# Patient Record
Sex: MALE | Race: WHITE | NOT HISPANIC OR LATINO | Employment: STUDENT | URBAN - METROPOLITAN AREA
[De-identification: names, ages, dates, MRNs, and addresses within clinical notes are randomized per-mention and may not be internally consistent; named-entity substitution may affect disease eponyms.]

---

## 2024-01-10 ENCOUNTER — OFFICE VISIT (OUTPATIENT)
Dept: PLASTIC SURGERY | Facility: HOSPITAL | Age: 19
End: 2024-01-10
Payer: COMMERCIAL

## 2024-01-10 ENCOUNTER — MULTIDISCIPLINARY VISIT (OUTPATIENT)
Dept: SPEECH THERAPY | Facility: HOSPITAL | Age: 19
End: 2024-01-10
Payer: COMMERCIAL

## 2024-01-10 VITALS
BODY MASS INDEX: 20.71 KG/M2 | DIASTOLIC BLOOD PRESSURE: 93 MMHG | TEMPERATURE: 98.4 F | WEIGHT: 147.93 LBS | SYSTOLIC BLOOD PRESSURE: 150 MMHG | HEIGHT: 71 IN | HEART RATE: 122 BPM

## 2024-01-10 DIAGNOSIS — Q35.9 CLEFT PALATE (HHS-HCC): Primary | ICD-10-CM

## 2024-01-10 DIAGNOSIS — M26.02 MAXILLARY HYPOPLASIA: ICD-10-CM

## 2024-01-10 DIAGNOSIS — M27.8 PALATAL FISTULA: ICD-10-CM

## 2024-01-10 PROCEDURE — 99213 OFFICE O/P EST LOW 20 MIN: CPT | Performed by: SURGERY

## 2024-01-10 PROCEDURE — 1036F TOBACCO NON-USER: CPT | Performed by: SURGERY

## 2024-01-10 ASSESSMENT — ENCOUNTER SYMPTOMS
DEPRESSION: 0
OCCASIONAL FEELINGS OF UNSTEADINESS: 0
LOSS OF SENSATION IN FEET: 0

## 2024-01-10 NOTE — PROGRESS NOTES
Speech-Language Pathology    [facility]                  Therapy Communication Note    Patient Name: Daniele Medina  MRN: [id number]  Today's Date: 1/10/2024     Discipline: Speech Language Pathology    Comment:   Daniele attended [facility] with Plastic surgery and Speech Pathology. He is s/p jaw surgery. Following jaw surgery he had a fistula and some concern from plastic surgery in regards to possible VPI.   During this informal assessment he did not demonstrate any concern for VPI. Daniele and his parents denied any changes in speech or concerns. At this time there is no further assessment needed from a speech standpoint. Follow up with plastic surgery in regards to fistula repair.

## 2024-01-10 NOTE — PROGRESS NOTES
Clinic Note    Reason For Visit  Palatal fistula    History Of Present Illness  Daniele Medina is a 18 y.o. male with a history of syndrome and cleft palate who most recently underwent orthognathic surgery including LeFort I, BSS oh and genioplasty with my partner Dr. Marte.  His other surgical history includes history of cleft palate repair, multiple eye surgeries and a surgically assisted assisted rapid palatal expansion performed in June 2020 by Dr. Cheng.      The family reports that overall he has been doing well since the surgery has been undergoing finishing orthodontic treatment which is almost completed.  He reports improvement in ability to chew and pain in the longer an issue.  The numbness in his upper and lower lip are also improving as well.  They have not noticed significant change in his speech.      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  He also noticed frequently food getting stuck in that area.  This fistula is very symptomatic and they are interested in discussing surgical options to address this.      Of note, Daniele is currently attending college via online format and is doing well and does not have any issues with communication.     Past Medical History  He has no past medical history on file.    Surgical History  He has a past surgical history that includes Myringotomy w/ tubes (10/13/2014); Other surgical history (10/13/2014); Other surgical history (10/13/2014); and Gastrostomy tube placement (10/13/2014).     Social History  He reports that he has never smoked. He has never been exposed to tobacco smoke. He has never used smokeless tobacco. He reports that he does not drink alcohol. No history on file for drug use.    Allergies  Patient has no known allergies.    Review of Systems  Negative other than what is included in the HPI.      Physical Exam  On exam, Daniele Medina is  well-appearing and well-developed.  he is breathing comfortably on room air and is in no distress.  Focused examination of His affected region reveals:     Lip: no cleft lip  Palate: Repaired cleft palate with a noticeable fistula present in the hard soft junction  Residual concave facial profile improved compared to before  Intraoral exam reveals slight positive overjet and overbite  Fixed orthodontic appliances are still in place    ISR: none  ISS: 3/4 crown  Overjet: 2mm  Overbite: 1mm  MxMid: conicident with facial midline  Mandmid: missing 1 incisor      Nasal exam reveals excess flaring of both ala, more on the left compared to the right  Poor nasal tip support  Mild septal deviation       No hypernasality    Relevant Results      Assessment/Plan     Daniele Medina is a 18 y.o. male who is now 7-month status post orthognathic surgery with asymptomatic palatal fistula.  Today we discussed treatment options for palatal fistula repair.  I recommend using both local flaps and buccal flap.  We did discuss the potential risk for fistula recurrence.  We also discussed the need for second surgery for buccal flap pedicle division.  We went over at length the indication, alternative and risk of this treatment option.  Given that he is very symptomatic I think he is a good candidate for the surgery.     In terms of his nose, we did discuss that he will ultimately benefit from a completion rhinoplasty to address the septal deviation, bilateral flaring and poor nasal tip support as a result of his dental facial deformity.  I would recommend waiting until we complete the palatal fistula repair.    The family is interested in scheduling the palatal fistula repair surgery this summer when he is on break from his studies.  Will plan to meet approximate 2 weeks before for preoperative appointment.  Of note, he does have a history of congenital heart disease and well likely require cardiac anesthesia to be available.    I spent  20 minutes in the professional and overall care of this patient.      Obed Su MD

## 2024-01-23 PROBLEM — M27.8: Status: ACTIVE | Noted: 2024-01-10

## 2024-01-23 PROBLEM — Q35.9 CLEFT PALATE (HHS-HCC): Status: ACTIVE | Noted: 2024-01-10

## 2024-05-06 ENCOUNTER — TELEMEDICINE (OUTPATIENT)
Dept: PLASTIC SURGERY | Facility: CLINIC | Age: 19
End: 2024-05-06
Payer: COMMERCIAL

## 2024-05-06 DIAGNOSIS — M27.8 PALATAL FISTULA: Primary | ICD-10-CM

## 2024-05-06 DIAGNOSIS — Q35.9 CLEFT PALATE (HHS-HCC): ICD-10-CM

## 2024-05-06 PROCEDURE — 99213 OFFICE O/P EST LOW 20 MIN: CPT | Performed by: SURGERY

## 2024-05-06 PROCEDURE — 1036F TOBACCO NON-USER: CPT | Performed by: SURGERY

## 2024-05-06 NOTE — H&P (VIEW-ONLY)
Clinic Note    Reason For Visit  Palatal fistula    History Of Present Illness  Daniele Medina is a 18 y.o. male with a history of syndrome and cleft palate who most recently underwent orthognathic surgery including LeFort I, BSS oh and genioplasty with my partner Dr. Marte in 2023.  His other surgical history includes history of cleft palate repair, multiple eye surgeries and a surgically assisted assisted rapid palatal expansion performed in June 2020 by Dr. Cheng. He was recently seen for a symptomatic palatal fistula and now presents for a preoperative visit for planned fistula repair.      Past Medical History  He has no past medical history on file.    Surgical History  He has a past surgical history that includes Myringotomy w/ tubes (10/13/2014); Other surgical history (10/13/2014); Other surgical history (10/13/2014); and Gastrostomy tube placement (10/13/2014).     Social History  He reports that he has never smoked. He has never been exposed to tobacco smoke. He has never used smokeless tobacco. He reports that he does not drink alcohol. No history on file for drug use.    Allergies  Patient has no known allergies.    Review of Systems  Negative other than what is included in the HPI.      Physical Exam  On exam, Daniele Medina is well-appearing and well-developed.  he is breathing comfortably on room air and is in no distress.  Focused examination of His affected region reveals:     Lip: no cleft lip  Palate: Repaired cleft palate with a noticeable fistula present in the hard soft junction  Residual concave facial profile improved compared to before  Intraoral exam reveals slight positive overjet and overbite  Fixed orthodontic appliances are still in place    ISR: none  ISS: 3/4 crown  Overjet: 2mm  Overbite: 1mm  MxMid: conicident with facial midline  Mandmid: missing 1 incisor      Nasal exam reveals excess flaring of both ala, more on the left compared to the right  Poor nasal tip support  Mild  septal deviation       No hypernasality    Relevant Results      Assessment/Plan     Daniele Medina is a 18 y.o. male who is now nearly 12-month status post orthognathic surgery with symptomatic palatal fistula.  Today we discussed details of palatal fistula repair.  I recommend using both local flaps and buccal flap.  We did discuss the potential risk for fistula recurrence.  We also discussed the need for second surgery for buccal flap pedicle division.  We went over at length the indication, alternative and risk of this treatment option.  Given that he is very symptomatic I think he is a good candidate for the surgery.     In terms of his nose, we did discuss that he will ultimately benefit from a completion rhinoplasty to address the septal deviation, bilateral flaring and poor nasal tip support as a result of his dental facial deformity.  I would recommend waiting until we complete the palatal fistula repair. 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     I spent 20 minutes in the professional and overall care of this patient.      Obed Su MD

## 2024-05-13 ENCOUNTER — ANESTHESIA EVENT (OUTPATIENT)
Dept: OPERATING ROOM | Facility: HOSPITAL | Age: 19
End: 2024-05-13
Payer: COMMERCIAL

## 2024-05-14 ENCOUNTER — HOSPITAL ENCOUNTER (OUTPATIENT)
Facility: HOSPITAL | Age: 19
Discharge: HOME | End: 2024-05-15
Attending: SURGERY | Admitting: SURGERY
Payer: COMMERCIAL

## 2024-05-14 ENCOUNTER — ANESTHESIA (OUTPATIENT)
Dept: OPERATING ROOM | Facility: HOSPITAL | Age: 19
End: 2024-05-14
Payer: COMMERCIAL

## 2024-05-14 DIAGNOSIS — M27.8 PALATAL FISTULA: Primary | ICD-10-CM

## 2024-05-14 DIAGNOSIS — Q35.9 CLEFT PALATE (HHS-HCC): ICD-10-CM

## 2024-05-14 PROBLEM — T88.4XXA DIFFICULT INTUBATION: Status: ACTIVE | Noted: 2024-05-14

## 2024-05-14 PROBLEM — I27.20 PULMONARY HYPERTENSION (MULTI): Status: ACTIVE | Noted: 2024-05-14

## 2024-05-14 PROBLEM — Z98.890 PONV (POSTOPERATIVE NAUSEA AND VOMITING): Status: ACTIVE | Noted: 2024-05-14

## 2024-05-14 PROBLEM — R11.2 PONV (POSTOPERATIVE NAUSEA AND VOMITING): Status: ACTIVE | Noted: 2024-05-14

## 2024-05-14 PROBLEM — I35.0 AORTIC STENOSIS: Status: ACTIVE | Noted: 2024-05-14

## 2024-05-14 PROCEDURE — 2500000002 HC RX 250 W HCPCS SELF ADMINISTERED DRUGS (ALT 637 FOR MEDICARE OP, ALT 636 FOR OP/ED): Performed by: STUDENT IN AN ORGANIZED HEALTH CARE EDUCATION/TRAINING PROGRAM

## 2024-05-14 PROCEDURE — 3700000002 HC GENERAL ANESTHESIA TIME - EACH INCREMENTAL 1 MINUTE: Performed by: SURGERY

## 2024-05-14 PROCEDURE — 2500000001 HC RX 250 WO HCPCS SELF ADMINISTERED DRUGS (ALT 637 FOR MEDICARE OP): Performed by: SURGERY

## 2024-05-14 PROCEDURE — 2500000005 HC RX 250 GENERAL PHARMACY W/O HCPCS: Performed by: STUDENT IN AN ORGANIZED HEALTH CARE EDUCATION/TRAINING PROGRAM

## 2024-05-14 PROCEDURE — 3700000001 HC GENERAL ANESTHESIA TIME - INITIAL BASE CHARGE: Performed by: SURGERY

## 2024-05-14 PROCEDURE — 7100000001 HC RECOVERY ROOM TIME - INITIAL BASE CHARGE: Performed by: SURGERY

## 2024-05-14 PROCEDURE — G0378 HOSPITAL OBSERVATION PER HR: HCPCS

## 2024-05-14 PROCEDURE — 2780000003 HC OR 278 NO HCPCS: Performed by: SURGERY

## 2024-05-14 PROCEDURE — 2500000004 HC RX 250 GENERAL PHARMACY W/ HCPCS (ALT 636 FOR OP/ED): Performed by: SURGERY

## 2024-05-14 PROCEDURE — 30600 REPAIR MOUTH/NOSE FISTULA: CPT | Performed by: SURGERY

## 2024-05-14 PROCEDURE — 7100000002 HC RECOVERY ROOM TIME - EACH INCREMENTAL 1 MINUTE: Performed by: SURGERY

## 2024-05-14 PROCEDURE — 2500000005 HC RX 250 GENERAL PHARMACY W/O HCPCS: Performed by: SURGERY

## 2024-05-14 PROCEDURE — 15733 MUSC MYOQ/FSCQ FLP H&N PEDCL: CPT | Performed by: SURGERY

## 2024-05-14 PROCEDURE — 2500000001 HC RX 250 WO HCPCS SELF ADMINISTERED DRUGS (ALT 637 FOR MEDICARE OP): Performed by: STUDENT IN AN ORGANIZED HEALTH CARE EDUCATION/TRAINING PROGRAM

## 2024-05-14 PROCEDURE — 2720000007 HC OR 272 NO HCPCS: Performed by: SURGERY

## 2024-05-14 PROCEDURE — 2500000004 HC RX 250 GENERAL PHARMACY W/ HCPCS (ALT 636 FOR OP/ED): Performed by: STUDENT IN AN ORGANIZED HEALTH CARE EDUCATION/TRAINING PROGRAM

## 2024-05-14 PROCEDURE — 15733 MUSC MYOQ/FSCQ FLP H&N PEDCL: CPT | Performed by: STUDENT IN AN ORGANIZED HEALTH CARE EDUCATION/TRAINING PROGRAM

## 2024-05-14 PROCEDURE — A4217 STERILE WATER/SALINE, 500 ML: HCPCS | Performed by: SURGERY

## 2024-05-14 PROCEDURE — A30600 PR REPAIR ORO-NASAL FISTULA: Performed by: STUDENT IN AN ORGANIZED HEALTH CARE EDUCATION/TRAINING PROGRAM

## 2024-05-14 PROCEDURE — 3600000009 HC OR TIME - EACH INCREMENTAL 1 MINUTE - PROCEDURE LEVEL FOUR: Performed by: SURGERY

## 2024-05-14 PROCEDURE — 3600000004 HC OR TIME - INITIAL BASE CHARGE - PROCEDURE LEVEL FOUR: Performed by: SURGERY

## 2024-05-14 RX ORDER — MIDAZOLAM HYDROCHLORIDE 1 MG/ML
INJECTION INTRAMUSCULAR; INTRAVENOUS AS NEEDED
Status: DISCONTINUED | OUTPATIENT
Start: 2024-05-14 | End: 2024-05-14

## 2024-05-14 RX ORDER — PHENYLEPHRINE HCL IN 0.9% NACL 0.4MG/10ML
SYRINGE (ML) INTRAVENOUS AS NEEDED
Status: DISCONTINUED | OUTPATIENT
Start: 2024-05-14 | End: 2024-05-14

## 2024-05-14 RX ORDER — DEXTROSE MONOHYDRATE AND SODIUM CHLORIDE 5; .45 G/100ML; G/100ML
100 INJECTION, SOLUTION INTRAVENOUS CONTINUOUS
Status: DISCONTINUED | OUTPATIENT
Start: 2024-05-14 | End: 2024-05-15 | Stop reason: HOSPADM

## 2024-05-14 RX ORDER — ONDANSETRON HYDROCHLORIDE 2 MG/ML
4 INJECTION, SOLUTION INTRAVENOUS EVERY 8 HOURS PRN
Status: DISCONTINUED | OUTPATIENT
Start: 2024-05-14 | End: 2024-05-14

## 2024-05-14 RX ORDER — HYDROMORPHONE HYDROCHLORIDE 1 MG/ML
0.2 INJECTION, SOLUTION INTRAMUSCULAR; INTRAVENOUS; SUBCUTANEOUS EVERY 10 MIN PRN
Status: DISCONTINUED | OUTPATIENT
Start: 2024-05-14 | End: 2024-05-14 | Stop reason: HOSPADM

## 2024-05-14 RX ORDER — PROPOFOL 10 MG/ML
INJECTION, EMULSION INTRAVENOUS AS NEEDED
Status: DISCONTINUED | OUTPATIENT
Start: 2024-05-14 | End: 2024-05-14

## 2024-05-14 RX ORDER — SODIUM CHLORIDE, SODIUM LACTATE, POTASSIUM CHLORIDE, CALCIUM CHLORIDE 600; 310; 30; 20 MG/100ML; MG/100ML; MG/100ML; MG/100ML
100 INJECTION, SOLUTION INTRAVENOUS CONTINUOUS
Status: DISCONTINUED | OUTPATIENT
Start: 2024-05-14 | End: 2024-05-14 | Stop reason: HOSPADM

## 2024-05-14 RX ORDER — ONDANSETRON HYDROCHLORIDE 2 MG/ML
4 INJECTION, SOLUTION INTRAVENOUS ONCE AS NEEDED
Status: DISCONTINUED | OUTPATIENT
Start: 2024-05-14 | End: 2024-05-14 | Stop reason: HOSPADM

## 2024-05-14 RX ORDER — SODIUM CHLORIDE, SODIUM LACTATE, POTASSIUM CHLORIDE, CALCIUM CHLORIDE 600; 310; 30; 20 MG/100ML; MG/100ML; MG/100ML; MG/100ML
INJECTION, SOLUTION INTRAVENOUS CONTINUOUS PRN
Status: DISCONTINUED | OUTPATIENT
Start: 2024-05-14 | End: 2024-05-14

## 2024-05-14 RX ORDER — ACETAMINOPHEN 160 MG/5ML
650 SUSPENSION ORAL EVERY 6 HOURS
Status: DISCONTINUED | OUTPATIENT
Start: 2024-05-14 | End: 2024-05-15 | Stop reason: HOSPADM

## 2024-05-14 RX ORDER — APREPITANT 40 MG/1
40 CAPSULE ORAL ONCE
Status: COMPLETED | OUTPATIENT
Start: 2024-05-14 | End: 2024-05-14

## 2024-05-14 RX ORDER — DORZOLAMIDE HYDROCHLORIDE AND TIMOLOL MALEATE 20; 5 MG/ML; MG/ML
1 SOLUTION/ DROPS OPHTHALMIC 2 TIMES DAILY
COMMUNITY

## 2024-05-14 RX ORDER — ACETAMINOPHEN 10 MG/ML
INJECTION, SOLUTION INTRAVENOUS AS NEEDED
Status: DISCONTINUED | OUTPATIENT
Start: 2024-05-14 | End: 2024-05-14

## 2024-05-14 RX ORDER — BRIMONIDINE TARTRATE 2 MG/ML
1 SOLUTION/ DROPS OPHTHALMIC 2 TIMES DAILY
COMMUNITY

## 2024-05-14 RX ORDER — CHLORHEXIDINE GLUCONATE ORAL RINSE 1.2 MG/ML
15 SOLUTION DENTAL 3 TIMES DAILY
Status: DISCONTINUED | OUTPATIENT
Start: 2024-05-14 | End: 2024-05-15 | Stop reason: HOSPADM

## 2024-05-14 RX ORDER — BUSPIRONE HYDROCHLORIDE 5 MG/1
5 TABLET ORAL 2 TIMES DAILY
Status: DISCONTINUED | OUTPATIENT
Start: 2024-05-14 | End: 2024-05-15 | Stop reason: HOSPADM

## 2024-05-14 RX ORDER — ROCURONIUM BROMIDE 10 MG/ML
INJECTION, SOLUTION INTRAVENOUS AS NEEDED
Status: DISCONTINUED | OUTPATIENT
Start: 2024-05-14 | End: 2024-05-14

## 2024-05-14 RX ORDER — PROPOFOL 10 MG/ML
INJECTION, EMULSION INTRAVENOUS CONTINUOUS PRN
Status: DISCONTINUED | OUTPATIENT
Start: 2024-05-14 | End: 2024-05-14

## 2024-05-14 RX ORDER — OXYCODONE HCL 5 MG/5 ML
5 SOLUTION, ORAL ORAL EVERY 6 HOURS PRN
Status: DISCONTINUED | OUTPATIENT
Start: 2024-05-14 | End: 2024-05-15 | Stop reason: HOSPADM

## 2024-05-14 RX ORDER — BUSPIRONE HYDROCHLORIDE 5 MG/1
5 TABLET ORAL 2 TIMES DAILY
COMMUNITY

## 2024-05-14 RX ORDER — ONDANSETRON HYDROCHLORIDE 2 MG/ML
INJECTION, SOLUTION INTRAVENOUS AS NEEDED
Status: DISCONTINUED | OUTPATIENT
Start: 2024-05-14 | End: 2024-05-14

## 2024-05-14 RX ORDER — DEXMEDETOMIDINE IN 0.9 % NACL 20 MCG/5ML
SYRINGE (ML) INTRAVENOUS AS NEEDED
Status: DISCONTINUED | OUTPATIENT
Start: 2024-05-14 | End: 2024-05-14

## 2024-05-14 RX ORDER — CHLORHEXIDINE GLUCONATE ORAL RINSE 1.2 MG/ML
SOLUTION DENTAL AS NEEDED
Status: DISCONTINUED | OUTPATIENT
Start: 2024-05-14 | End: 2024-05-14 | Stop reason: HOSPADM

## 2024-05-14 RX ORDER — CEFAZOLIN 1 G/1
INJECTION, POWDER, FOR SOLUTION INTRAVENOUS AS NEEDED
Status: DISCONTINUED | OUTPATIENT
Start: 2024-05-14 | End: 2024-05-14

## 2024-05-14 RX ORDER — LIDOCAINE HYDROCHLORIDE 20 MG/ML
INJECTION, SOLUTION INFILTRATION; PERINEURAL AS NEEDED
Status: DISCONTINUED | OUTPATIENT
Start: 2024-05-14 | End: 2024-05-14

## 2024-05-14 RX ORDER — TRIPROLIDINE/PSEUDOEPHEDRINE 2.5MG-60MG
400 TABLET ORAL EVERY 6 HOURS SCHEDULED
Status: DISCONTINUED | OUTPATIENT
Start: 2024-05-14 | End: 2024-05-15 | Stop reason: HOSPADM

## 2024-05-14 RX ORDER — FENTANYL CITRATE 50 UG/ML
INJECTION, SOLUTION INTRAMUSCULAR; INTRAVENOUS AS NEEDED
Status: DISCONTINUED | OUTPATIENT
Start: 2024-05-14 | End: 2024-05-14

## 2024-05-14 RX ORDER — LATANOPROST 50 UG/ML
1 SOLUTION/ DROPS OPHTHALMIC NIGHTLY
COMMUNITY

## 2024-05-14 RX ORDER — HYDROMORPHONE HYDROCHLORIDE 1 MG/ML
INJECTION, SOLUTION INTRAMUSCULAR; INTRAVENOUS; SUBCUTANEOUS AS NEEDED
Status: DISCONTINUED | OUTPATIENT
Start: 2024-05-14 | End: 2024-05-14

## 2024-05-14 RX ORDER — BUPIVACAINE HYDROCHLORIDE AND EPINEPHRINE 2.5; 5 MG/ML; UG/ML
INJECTION, SOLUTION EPIDURAL; INFILTRATION; INTRACAUDAL; PERINEURAL AS NEEDED
Status: DISCONTINUED | OUTPATIENT
Start: 2024-05-14 | End: 2024-05-14 | Stop reason: HOSPADM

## 2024-05-14 RX ADMIN — PROPOFOL 50 MG: 10 INJECTION, EMULSION INTRAVENOUS at 11:23

## 2024-05-14 RX ADMIN — DEXAMETHASONE SODIUM PHOSPHATE 8 MG: 4 INJECTION INTRA-ARTICULAR; INTRALESIONAL; INTRAMUSCULAR; INTRAVENOUS; SOFT TISSUE at 11:59

## 2024-05-14 RX ADMIN — SODIUM CHLORIDE, SODIUM LACTATE, POTASSIUM CHLORIDE, CALCIUM CHLORIDE: 600; 310; 30; 20 INJECTION, SOLUTION INTRAVENOUS at 12:45

## 2024-05-14 RX ADMIN — Medication 4 MCG: at 14:01

## 2024-05-14 RX ADMIN — IBUPROFEN 400 MG: 100 SUSPENSION ORAL at 19:41

## 2024-05-14 RX ADMIN — ROCURONIUM BROMIDE 5 MG: 10 INJECTION INTRAVENOUS at 14:03

## 2024-05-14 RX ADMIN — CEFAZOLIN 2 G: 330 INJECTION, POWDER, FOR SOLUTION INTRAMUSCULAR; INTRAVENOUS at 12:05

## 2024-05-14 RX ADMIN — HYDROMORPHONE HYDROCHLORIDE 0.2 MG: 1 INJECTION, SOLUTION INTRAMUSCULAR; INTRAVENOUS; SUBCUTANEOUS at 13:03

## 2024-05-14 RX ADMIN — FENTANYL CITRATE 25 MCG: 50 INJECTION, SOLUTION INTRAMUSCULAR; INTRAVENOUS at 11:59

## 2024-05-14 RX ADMIN — DEXAMETHASONE SODIUM PHOSPHATE 8 MG: 4 INJECTION, SOLUTION INTRA-ARTICULAR; INTRALESIONAL; INTRAMUSCULAR; INTRAVENOUS; SOFT TISSUE at 20:32

## 2024-05-14 RX ADMIN — CHLORHEXIDINE GLUCONATE 0.12% ORAL RINSE 15 ML: 1.2 LIQUID ORAL at 20:32

## 2024-05-14 RX ADMIN — SODIUM CHLORIDE, POTASSIUM CHLORIDE, SODIUM LACTATE AND CALCIUM CHLORIDE: 600; 310; 30; 20 INJECTION, SOLUTION INTRAVENOUS at 11:22

## 2024-05-14 RX ADMIN — HYDROMORPHONE HYDROCHLORIDE 0.2 MG: 1 INJECTION, SOLUTION INTRAMUSCULAR; INTRAVENOUS; SUBCUTANEOUS at 14:41

## 2024-05-14 RX ADMIN — FENTANYL CITRATE 75 MCG: 50 INJECTION, SOLUTION INTRAMUSCULAR; INTRAVENOUS at 11:24

## 2024-05-14 RX ADMIN — PROPOFOL 25 MCG/KG/MIN: 10 INJECTION, EMULSION INTRAVENOUS at 12:01

## 2024-05-14 RX ADMIN — PROPOFOL 30 MG: 10 INJECTION, EMULSION INTRAVENOUS at 11:25

## 2024-05-14 RX ADMIN — ACETAMINOPHEN 650 MG: 160 SUSPENSION ORAL at 22:49

## 2024-05-14 RX ADMIN — SUGAMMADEX 200 MG: 100 INJECTION, SOLUTION INTRAVENOUS at 14:55

## 2024-05-14 RX ADMIN — PROPOFOL 20 MG: 10 INJECTION, EMULSION INTRAVENOUS at 11:28

## 2024-05-14 RX ADMIN — MIDAZOLAM HYDROCHLORIDE 2 MG: 1 INJECTION, SOLUTION INTRAMUSCULAR; INTRAVENOUS at 11:08

## 2024-05-14 RX ADMIN — ACETAMINOPHEN 1000 MG: 10 INJECTION, SOLUTION INTRAVENOUS at 12:21

## 2024-05-14 RX ADMIN — PROPOFOL 10 MG: 10 INJECTION, EMULSION INTRAVENOUS at 12:50

## 2024-05-14 RX ADMIN — LIDOCAINE HYDROCHLORIDE 60 MG: 20 INJECTION, SOLUTION INFILTRATION; PERINEURAL at 11:24

## 2024-05-14 RX ADMIN — ACETAMINOPHEN 650 MG: 160 SUSPENSION ORAL at 17:42

## 2024-05-14 RX ADMIN — DEXTROSE AND SODIUM CHLORIDE 100 ML/HR: 5; 450 INJECTION, SOLUTION INTRAVENOUS at 17:42

## 2024-05-14 RX ADMIN — ROCURONIUM BROMIDE 10 MG: 10 INJECTION INTRAVENOUS at 13:00

## 2024-05-14 RX ADMIN — Medication 4 MCG: at 13:37

## 2024-05-14 RX ADMIN — ROCURONIUM BROMIDE 50 MG: 10 INJECTION INTRAVENOUS at 11:25

## 2024-05-14 RX ADMIN — Medication 4 MCG: at 12:47

## 2024-05-14 RX ADMIN — APREPITANT 40 MG: 40 CAPSULE ORAL at 10:45

## 2024-05-14 RX ADMIN — CHLORHEXIDINE GLUCONATE 0.12% ORAL RINSE 15 ML: 1.2 LIQUID ORAL at 18:52

## 2024-05-14 RX ADMIN — Medication 4 MCG: at 12:55

## 2024-05-14 RX ADMIN — PROPOFOL 30 MG: 10 INJECTION, EMULSION INTRAVENOUS at 11:27

## 2024-05-14 RX ADMIN — ROCURONIUM BROMIDE 10 MG: 10 INJECTION INTRAVENOUS at 12:48

## 2024-05-14 RX ADMIN — Medication 40 MCG: at 11:40

## 2024-05-14 RX ADMIN — ONDANSETRON 4 MG: 2 INJECTION INTRAMUSCULAR; INTRAVENOUS at 14:41

## 2024-05-14 SDOH — HEALTH STABILITY: MENTAL HEALTH: CURRENT SMOKER: 0

## 2024-05-14 ASSESSMENT — PAIN - FUNCTIONAL ASSESSMENT
PAIN_FUNCTIONAL_ASSESSMENT: 0-10

## 2024-05-14 ASSESSMENT — PAIN SCALES - GENERAL
PAINLEVEL_OUTOF10: 1
PAINLEVEL_OUTOF10: 0 - NO PAIN
PAINLEVEL_OUTOF10: 1

## 2024-05-14 ASSESSMENT — PAIN INTENSITY VAS
VAS_PAIN_GENERAL: 2
VAS_PAIN_GENERAL: 1

## 2024-05-14 NOTE — PROGRESS NOTES
"  Department of Plastic and Reconstructive Surgery  Daily Progress Note    Patient Name: Daniele Medina  MRN: [id number]  Date:  05/14/24     Subjective  Resting in bed comfortably post operatively. Denies any acute concerns, pain well controlled. Denies any fever, chills, night sweats, CP, SOB, palpitations, nausea, vomiting, or abdominal pain/discomfort.     Overnight Events  POD0 S/P palatal fistula repair, left buccal fat flap    Objective    Vital Signs  /78 (BP Location: Left arm, Patient Position: Lying)   Pulse 96   Temp 36.5 °C (97.7 °F) (Temporal)   Resp 16   Ht 1.78 m (5' 10.08\")   Wt 67.1 kg (148 lb 0.6 oz)   SpO2 96%   BMI 21.19 kg/m²      Physical Exam   Constitutional: Alert, awake, calm and cooperative. NAD. Parents at bedside  Eyes: EOMI, PERRL  Mouth: Intraoral incisions intact with no evidence of drainage, bleeding or dehiscence. No cleft lip.  Head/Neck: Residual concave facial profile  Respiratory: Unlabored respirations on RA  Cardiovascular: RRR on telemetry monitor  Abdomen: Soft, nt/nd  : Voiding independently  MSK: MAEx4, no swelling of joints  Neuro: A&Ox3, no deficits appreciated  Skin: Warm, dry, intact  Psych: Appropriate mood/behavior    Diagnostics   No results found for this or any previous visit (from the past 24 hour(s)).  No results found.    Current Medications  Scheduled medications  acetaminophen, 650 mg, oral, q6h  busPIRone, 5 mg, oral, BID  chlorhexidine, 15 mL, Swish & Spit, TID  [START ON 5/15/2024] dexAMETHasone, 4 mg, intravenous, q8h  dexAMETHasone, 8 mg, intravenous, Once  ibuprofen, 400 mg, oral, q6h KAM      Continuous medications  dextrose 5%-0.45 % sodium chloride, 100 mL/hr      PRN medications  PRN medications: oxyCODONE     Assessment  -5/16/23- Lefort 1, BSSO, genioplasty   -6/29/20- surgically assisted rapid palatal expansion   -CP repair, and eye surgery x 2    Daniele is now s/p palatal fistula repair and left buccal fat flap today with Dr. Su. " Patient was extubated post-operatively without complication, and when stable transferred from PACU to the regular [place] [id number] nursing floor.  Oxygen saturation has remained stable since extubation in OR.     Plan:  - Monitor overnight on regular nursing floor  - Continuous pulse ox overnight. Keep HOB elevated.   - Full liquid diet        ·  No straws. Use care when using utensils to not disrupt intraoral closure.  - IVF saline lock when tolerating liquid diet  - Decadron q8 x 3 doses. 8mg (At 2030) then 4 mg x 2. (0430, 1230)  - No zofran, given cardiac history (aortic stenosis, unicuspid aortic valve)  - Only suction intraorally with soft French catheter suction to help with oral secretions as needed, but avoid the palate area. Do not suction with Yankauer.  - SCDs until ambulating. Early ambulation.   - Post operative pain management:   - Acetaminophen PO Q6 Scheduled   - Ibuprofen PO Q6 Scheduled   - Oxycodone PO Q6 PRN for moderate pain- Use for first line breakthrough pain  - Anxiety- continue home Buspar PO 5mg BID   - Home going medication to be sent to preferred outpatient pharmacy   - Follow up with Dr. Su in 2 weeks (appt time to be given at discharge)    Patient and plan discussed with Dr. Georges Thomas, PIOTR-CNP  Plastic and Reconstructive Surgery   Available via Epic chat, pager: [phone] or team phones: [phone]

## 2024-05-14 NOTE — ANESTHESIA PROCEDURE NOTES
Airway  Date/Time: 5/14/2024 11:29 AM  Urgency: elective    Difficult airway    Staffing  Performed: resident   Authorized by: Dorothy Morrow MD    Performed by: Margaret Cottrell MD  Patient location during procedure: OR    Indications and Patient Condition  Indications for airway management: anesthesia  Spontaneous Ventilation: absent  Sedation level: deep  Preoxygenated: yes  Patient position: sniffing  Mask difficulty assessment: 1 - vent by mask    Final Airway Details  Final airway type: endotracheal airway      Successful airway: ETT and MEAGHAN tube  Cuffed: yes   Successful intubation technique: video laryngoscopy (Glidescope, S3 blade)  Facilitating devices/methods: intubating stylet (light cricoid pressure; glidescope stylet)  Endotracheal tube insertion site: oral  Blade size: #3 (S3)  ETT size (mm): 7.5  Cormack-Lehane Classification: grade I - full view of glottis  Placement verified by: chest auscultation and capnometry   Cuff volume (mL): 2  Number of attempts at approach: 1  Number of other approaches attempted: 0    Additional Comments  Easy BMV. Grade 1 view with Glidescope S3 blade with glidescope stylet and light cricoid pressure; ETT passed through vocal cords easily; atraumatic intubation, teeth and lips in pre-anesthetic condition.

## 2024-05-14 NOTE — PERIOPERATIVE NURSING NOTE
1508- Pt received from OR, resting quietly, connected to monitor with alarms set and on. HOB flat, with SR up and wheels locked. Report obtained from anesthesia. VSS. Will cont to monitor   1520- Pt awake asking appropriate questions.    1524- Parents at BS.  1535- Pt resting quietly, RN unavailable to get report at this time   1548- Dr Su at BS to speak with patient and family  1552- Pt resting quietly, report called to Blanquita LOWE  1559- Pt resting quietly, transported to  with this RN and Herve LOWE transporting pt.

## 2024-05-14 NOTE — ANESTHESIA PROCEDURE NOTES
Peripheral IV  Date/Time: 5/14/2024 11:40 AM  Inserted by: Margaret Cottrell MD    Placement  Needle size: 18 G  Laterality: right  Location: hand  Local anesthetic: none  Site prep: alcohol  Technique: anatomical landmarks  Attempts: 1

## 2024-05-14 NOTE — ANESTHESIA POSTPROCEDURE EVALUATION
Patient: Daniele Medina    Procedure Summary       Date: 05/14/24 Room / Location: [facility] OR 04 / [facility]    Anesthesia Start: 1107 Anesthesia Stop: 1520    Procedure: Repair Fistula Oronasal (Mouth) Diagnosis:       Cleft palate ([facility])      Palatal fistula      (Cleft palate [Q35.9])      (Palatal fistula [M27.8])    Surgeons: Obed Su MD Responsible Provider: Dorothy Morrow MD    Anesthesia Type: general ASA Status: 3            Anesthesia Type: general    Vitals Value Taken Time   /68 05/14/24 1508   Temp 36.8 05/14/24 1520   Pulse 102 05/14/24 1508   Resp 16 05/14/24 1520   SpO2 95 % 05/14/24 1508       Anesthesia Post Evaluation    Patient location during evaluation: PACU  Patient participation: complete - patient participated  Level of consciousness: awake  Pain management: adequate  Airway patency: patent  Cardiovascular status: acceptable  Respiratory status: acceptable  Hydration status: acceptable  Postoperative Nausea and Vomiting: none        No notable events documented.

## 2024-05-14 NOTE — OP NOTE
Repair Fistula Oronasal Operative Note     Date: 2024  OR Location: [facility]    Name: Daniele Medina, : 2005, Age: 18 y.o., MRN: [id number], Sex: male    Diagnosis  Pre-op Diagnosis     * Cleft palate ([facility]) [Q35.9]     * Palatal fistula [M27.8] Post-op Diagnosis     * Cleft palate ([facility]) [Q35.9]     * Palatal fistula [M27.8]     Procedures  Palatal Fistula repair (47069)  Left buccal fat flap (26943)  Surgeons      * Obed Su - Primary    Resident/Fellow/Other Assistant:  Surgeons and Role:     * Darion Ortiz PA-C - YOSELIN First Assist    Darion Ortiz PA-C served as the first surgical assist as there were no qualified residents available.     Procedure Summary  Anesthesia: General  ASA: III  Anesthesia Staff: Anesthesiologist: Dorothy Morrow MD; Everette King MD  Anesthesia Resident: Margaret Cottrell MD  Estimated Blood Loss: 25mL  Intra-op Medications:   Administrations occurring from 0935 to 1300 on 24:   Medication Name Total Dose   BUPivacaine-EPINEPHrine (PF) (Marcaine w/EPI) 0.25 %-1:200,000 injection 6 mL   chlorhexidine (Peridex) 0.12 % solution 15 mL   aprepitant (Emend) capsule 40 mg 40 mg              Anesthesia Record               Intraprocedure I/O Totals          Intake    Propofol Drip 0.00 mL    The total shown is the total volume documented since Anesthesia Start was filed.    lactated Ringer's 1200.00 mL    Total Intake 1200 mL          Specimen: No specimens collected     Staff:   Circulator: La Nena Knight RN; Kasey Stoner RN; Zayra Stanford RN  Scrub Person: Em Zhou; Savanna Carson RN         Drains and/or Catheters: * None in log *    Tourniquet Times:         Implants:     Findings: large fistula present near the hard palate and soft palate junction    Indications: Daniele Medina is an 18 y.o. male who is having surgery for Cleft palate [Q35.9]  Palatal fistula [M27.8].  Patient had previously underwent palate repair for cleft palate and  jaw surgery and has a large fistula present in the hard and soft palate junction which is very symptomatic.  He now presents for repair.    The patient was seen in the preoperative area. The risks, benefits, complications, treatment options, non-operative alternatives, expected recovery and outcomes were discussed with the patient. The possibilities of reaction to medication, fistula recurrence, wound dehiscence, speech disturbance or changes, need for additional surgery, bleeding, pain, scarring, pulmonary aspiration, injury to surrounding structures, bleeding, recurrent infection, the need for additional procedures, failure to diagnose a condition, and creating a complication requiring transfusion or operation were discussed with the patient. The patient concurred with the proposed plan, giving informed consent.  The site of surgery was properly noted/marked if necessary per policy. The patient has been actively warmed in preoperative area. Preoperative antibiotics have been ordered and given within 1 hours of incision. Venous thrombosis prophylaxis have been ordered including bilateral sequential compression devices    Procedure Details:   The patient was subsequently brought to the operating room and placed supine on the operating room table.  All bony prominences were well padded.  A time out was performed verifying patient by name, age birth date, medical record number, procedure, and laterality of procedure to be performed.  Following this mask anesthesia was then induced, an IV was then placed and full general endotracheal anesthesia was then performed by the anesthesia team. The HOB was then turned 90° anesthesia team.   The face was then prepped and draped in usual aseptic fashion.   Of note, the patient has a large palatal fistula at the hard and soft palate junction and significant scarring.      The face was then prepped and draped in the usual sterile fashion. A Luis mouth gag was then placed to  allow us to fully evaluate the underlying degree of clefting of the palate. We then marked our planned palate repair along the cleft margin extending up to the hard palate with bilateral Bardach flaps.  Of note, there was significant degree of invagination of the palatal mucosa at the edges of the cleft margin.  Local anesthetic was then injected after we had marked the cleft repair which was done with 0.25% bupivicaine with epinephrine.  This was done in a subperiosteal plane to hydro dissect the planned area of dissection.  Adequate time was allowed for hemostatic and anesthetic effect.    I marked out a circular incision around the fistula site for nasal termer flaps.  I then marked out a large bipedicle palatal flap based on both greater palatine vascular pedicle.  I then began by making incision sharply using a 15 blade at all the sites and began elevating the hard palate flap in the subperiosteal plane.  Dissection was then carefully carried through all the scar tissue to preserve both greater palatine vessels.  They were identified and preserved throughout.  Dissection proceeded onto the soft palate sharply using scissors in the submucosal plane to rightly delaminated the mucosa away from the fistula site.  This was completed, I then obtained hemostasis using bipolar and Floseal.    I then began closure of the nasal lining using 5-0 Vicryl using the nasal mucosal turned over flaps.  This was completed in a straight line fashion with minimal tension.    In order to reinforce the repair, I then move on to elevate a left-sided buccal fat flap.  Scissors were used to make a blunt dissection through the lateral buccal mucosa and buccinator muscle.  Suction was placed into the pocket with easy return of buccal fat.  Two Debakey forceps were then used to gently teased the investing fascial layer of the buccal fat allowing mobilization of the buccal fat tissue into the field.  A right angle was then used to open a  pocket behind the left-sided pedicle to the Bardach flaps through which the buccal fat flap was delivered.  The buccal fat flap was based on a branch of the buccal artery.  The flap was then inset across the midline using tissue glue to over the area of tension near the junction of the hard and soft palate to provide additional vascularized tissue to however provide reinforcement of the repair. Tissue fibrin glue was then used to further secure the fat flaps.    Next, I then moved onto oral side closure with additional 4-0 and 5-0 Vicryl sutures.  This completed from distal to proximal along the midline using simple interrupted and horizontal mattress sutures.  There is minimal tension after the lateral releasing incisions.  Once is completed I then inset the hard palate flap to the anterior alveolus using 4-0 Vicryl.  I also obtain further hemostasis using Floseal and Gelfoam and was secured using 4-0 Vicryl sutures.  Following this, an NG tube was used to decompressed the abdomen.  The Luis gag was then removed with no issues.    At the completion of the procedure all needle instrument and sponge counts were correct x2.  The patient was returned to anesthesia for emergence and extubation and transferred to the recovery area in stable condition.  There were no apparent complications.      Complications:  None; patient tolerated the procedure well.    Disposition: PACU - hemodynamically stable.  Condition: stable         Additional Details: none    Attending Attestation: I was present and scrubbed for the entire procedure.    Obed Su  Phone Number: [phone]

## 2024-05-14 NOTE — ANESTHESIA PREPROCEDURE EVALUATION
Patient: Daniele Medina    Procedure Information       Date/Time: 05/14/24 0935    Procedure: Repair Fistula Oronasal    Location: [facility] / [facility] [place]    Surgeons: Obed Su MD            Relevant Problems   Anesthesia  Pt is s/p Lefort 1 osteotomy on 5/16/2023 (please see anesthesia record for all details). Prior to Lefort surgery, airway exam c/f difficult intubation(MP 4, small mouth opening, HM and TM distance <3cm and 6cm respectively, maxillary hypoplasia). Pt EASY BMV; nasal intubation achieved with both Glidescope and fiberoptic assistance; pt needed to be RE-intubated due to  balloon/cuff leak, with multiple attempts and ultimately ENT attending was successful.    (+) Difficult intubation   (+) PONV (postoperative nausea and vomiting)   (-) Family history of malignant hyperthermia      Cardiac  Pt followed by Peds Cardiology at [facility] for unicuspid aortic valve with MODERATE AORTIC VALVE STENOSIS. Last Echo is April 2024 (Results below); aortic valve stenosis is STABLE. Pt DENIES any cardiac symptoms; he exercises and runs on treadmill without chest pain or SOB.    ECHO SUMMARY:   1. Aortic valve: The valve is unicuspid. There is moderate      stenosis. The peak gradient = 49 mmHg, and the mean gradient =      28 mmHg.   2. Left ventricle: The cavity size is normal. Wall thickness is      mildly increased.   3. Sino-tubular junction is 3.57cm (z-score +5.4). Ascending aorta      is 4.50 (z-score +7.4). Aortic root is 3.53cm (z-score 2.5)      (+) Aortic stenosis      Pulmonary (within normal limits)      Neuro (within normal limits)      GI (within normal limits)      /Renal (within normal limits)      Liver (within normal limits)      Endocrine (within normal limits)      Hematology (within normal limits)      ID (within normal limits)      ENT   (+) Cleft palate (HHS-HCC)   (+) Palatal fistula       Clinical information reviewed:   Tobacco  Allergies  Meds    Med Hx  Surg Hx   Fam Hx  Soc Hx        NPO Detail:  NPO/Void Status  Date of Last Liquid: 05/14/24  Time of Last Liquid: 0000  Date of Last Solid: 05/13/24  Time of Last Solid: 2330  Last Intake Type: Clear fluids         Physical Exam    Airway  Mallampati: II  TM distance: <3 FB  Neck ROM: full  Comments: Small mouth opening. Short TM and HM distance.  Dentition is good.  Full neck ROM   Cardiovascular   Rate: normal     Dental - normal exam     Pulmonary   Breath sounds clear to auscultation     Abdominal      Other findings: Noted to be very anxious in preop.           Anesthesia Plan    History of general anesthesia?: yes  History of complications of general anesthesia?: yes    ASA 3     general   (Consented pt for GA with ETT. Plan for preop PIV with IV midazolam premedication. Pt and parents are in agreement and give verbal consent to proceed with general anesthesia; all questions answered.   Pt previously difficult intubation, planning to optimize airway positioning, adequate pre-oxygenation, slow controlled induction given patient's moderate aortic stenosis (optimize preload with IVFs and avoid hypotension), and will proceed with oral intubation (oral MEAGHAN tube) with Glidescope. )  The patient is not a current smoker.    intravenous induction   Postoperative administration of opioids is intended.  Trial extubation is planned.  Anesthetic plan and risks discussed with patient, father and mother (Patient consents for himself; mother and father at preop bedside.).  Use of blood products discussed with patient who.    Plan discussed with resident.

## 2024-05-14 NOTE — ANESTHESIA PROCEDURE NOTES
Peripheral IV  Date/Time: 5/14/2024 10:20 AM  Inserted by: Dorothy Morrow MD    Placement  Needle size: 20 G  Laterality: right  Location: hand  Local anesthetic: topical anesthetic  Site prep: alcohol  Technique: anatomical landmarks  Attempts: 1

## 2024-05-14 NOTE — BRIEF OP NOTE
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.8]     Procedures  Repair Fistula Oronasal  84880 - CA REPAIR FISTULA ORONASAL    Left buccal fat flap (16522)   Surgeons      * Obed Su - Primary    Resident/Fellow/Other Assistant:  Surgeons and Role:     * Darion Ortiz PA-C - YOSELIN First Assist    Procedure Summary  Anesthesia: General  ASA: III  Anesthesia Staff: Anesthesiologist: Dorothy Morrow MD; Everette King MD  Anesthesia Resident: Margaret Cottrell MD  Estimated Blood Loss: 25mL  Intra-op Medications:   Administrations occurring from 0935 to 1300 on 24:   Medication Name Total Dose   BUPivacaine-EPINEPHrine (PF) (Marcaine w/EPI) 0.25 %-1:200,000 injection 6 mL   chlorhexidine (Peridex) 0.12 % solution 15 mL   thrombin (Human)-fibrinogen-aprotinin-calcium (Tisseel) 4 mL 4 mL   aprepitant (Emend) capsule 40 mg 40 mg              Anesthesia Record               Intraprocedure I/O Totals          Intake    Propofol Drip 0.00 mL    The total shown is the total volume documented since Anesthesia Start was filed.    lactated Ringer's 1200.00 mL    Total Intake 1200 mL          Specimen: No specimens collected     Staff:   Circulator: La Nena Knight RN; Kasey Stoner RN; Zayra Stanford RN  Scrub Person: Em Zhou; Savanna Carson RN          Findings: large fistula present near the hard palate and soft palate junction     Complications:  None; patient tolerated the procedure well.     Disposition: PACU - hemodynamically stable.  Condition: stable  Specimens Collected: No specimens collected

## 2024-05-15 VITALS
HEART RATE: 104 BPM | OXYGEN SATURATION: 97 % | SYSTOLIC BLOOD PRESSURE: 130 MMHG | WEIGHT: 148.04 LBS | HEIGHT: 70 IN | RESPIRATION RATE: 18 BRPM | BODY MASS INDEX: 21.19 KG/M2 | TEMPERATURE: 98.6 F | DIASTOLIC BLOOD PRESSURE: 88 MMHG

## 2024-05-15 PROBLEM — R11.2 PONV (POSTOPERATIVE NAUSEA AND VOMITING): Status: RESOLVED | Noted: 2024-05-14 | Resolved: 2024-05-15

## 2024-05-15 PROBLEM — Z98.890 PONV (POSTOPERATIVE NAUSEA AND VOMITING): Status: RESOLVED | Noted: 2024-05-14 | Resolved: 2024-05-15

## 2024-05-15 PROCEDURE — 2500000004 HC RX 250 GENERAL PHARMACY W/ HCPCS (ALT 636 FOR OP/ED): Performed by: STUDENT IN AN ORGANIZED HEALTH CARE EDUCATION/TRAINING PROGRAM

## 2024-05-15 PROCEDURE — 7100000011 HC EXTENDED STAY RECOVERY HOURLY - NURSING UNIT

## 2024-05-15 PROCEDURE — G0378 HOSPITAL OBSERVATION PER HR: HCPCS

## 2024-05-15 PROCEDURE — 2500000001 HC RX 250 WO HCPCS SELF ADMINISTERED DRUGS (ALT 637 FOR MEDICARE OP): Performed by: STUDENT IN AN ORGANIZED HEALTH CARE EDUCATION/TRAINING PROGRAM

## 2024-05-15 RX ORDER — ACETAMINOPHEN 325 MG/1
650 TABLET ORAL EVERY 6 HOURS PRN
Qty: 30 TABLET | Refills: 0 | Status: SHIPPED | OUTPATIENT
Start: 2024-05-15 | End: 2024-06-10 | Stop reason: ALTCHOICE

## 2024-05-15 RX ORDER — IBUPROFEN 200 MG
400 TABLET ORAL EVERY 6 HOURS
Qty: 112 TABLET | Refills: 0 | Status: SHIPPED | OUTPATIENT
Start: 2024-05-15 | End: 2024-05-29

## 2024-05-15 RX ORDER — OXYCODONE HYDROCHLORIDE 5 MG/1
5 TABLET ORAL EVERY 6 HOURS PRN
Qty: 10 TABLET | Refills: 0 | Status: SHIPPED | OUTPATIENT
Start: 2024-05-15 | End: 2024-06-10 | Stop reason: ALTCHOICE

## 2024-05-15 RX ORDER — CHLORHEXIDINE GLUCONATE ORAL RINSE 1.2 MG/ML
15 SOLUTION DENTAL 3 TIMES DAILY
Qty: 473 ML | Refills: 1 | Status: SHIPPED | OUTPATIENT
Start: 2024-05-15 | End: 2024-06-10 | Stop reason: ALTCHOICE

## 2024-05-15 RX ADMIN — DEXAMETHASONE SODIUM PHOSPHATE 4 MG: 4 INJECTION, SOLUTION INTRA-ARTICULAR; INTRALESIONAL; INTRAMUSCULAR; INTRAVENOUS; SOFT TISSUE at 04:45

## 2024-05-15 RX ADMIN — DEXTROSE AND SODIUM CHLORIDE 100 ML/HR: 5; 450 INJECTION, SOLUTION INTRAVENOUS at 03:04

## 2024-05-15 RX ADMIN — IBUPROFEN 400 MG: 100 SUSPENSION ORAL at 12:00

## 2024-05-15 RX ADMIN — CHLORHEXIDINE GLUCONATE 0.12% ORAL RINSE 15 ML: 1.2 LIQUID ORAL at 09:48

## 2024-05-15 RX ADMIN — BUSPIRONE HYDROCHLORIDE 5 MG: 5 TABLET ORAL at 09:48

## 2024-05-15 RX ADMIN — ACETAMINOPHEN 650 MG: 160 SUSPENSION ORAL at 04:45

## 2024-05-15 RX ADMIN — ACETAMINOPHEN 650 MG: 160 SUSPENSION ORAL at 10:36

## 2024-05-15 RX ADMIN — IBUPROFEN 400 MG: 100 SUSPENSION ORAL at 06:21

## 2024-05-15 RX ADMIN — IBUPROFEN 400 MG: 100 SUSPENSION ORAL at 00:40

## 2024-05-15 SDOH — SOCIAL STABILITY: SOCIAL INSECURITY: ARE THERE ANY APPARENT SIGNS OF INJURIES/BEHAVIORS THAT COULD BE RELATED TO ABUSE/NEGLECT?: NO

## 2024-05-15 SDOH — ECONOMIC STABILITY: INCOME INSECURITY: HOW HARD IS IT FOR YOU TO PAY FOR THE VERY BASICS LIKE FOOD, HOUSING, MEDICAL CARE, AND HEATING?: NOT HARD AT ALL

## 2024-05-15 SDOH — ECONOMIC STABILITY: HOUSING INSECURITY: DO YOU FEEL UNSAFE GOING BACK TO THE PLACE WHERE YOU LIVE?: NO

## 2024-05-15 SDOH — ECONOMIC STABILITY: HOUSING INSECURITY
IN THE LAST 12 MONTHS, WAS THERE A TIME WHEN YOU DID NOT HAVE A STEADY PLACE TO SLEEP OR SLEPT IN A SHELTER (INCLUDING NOW)?: NO

## 2024-05-15 SDOH — ECONOMIC STABILITY: TRANSPORTATION INSECURITY
IN THE PAST 12 MONTHS, HAS THE LACK OF TRANSPORTATION KEPT YOU FROM MEDICAL APPOINTMENTS OR FROM GETTING MEDICATIONS?: NO

## 2024-05-15 SDOH — SOCIAL STABILITY: SOCIAL INSECURITY: WERE YOU ABLE TO COMPLETE ALL THE BEHAVIORAL HEALTH SCREENINGS?: YES

## 2024-05-15 SDOH — SOCIAL STABILITY: SOCIAL INSECURITY: HAVE YOU HAD ANY THOUGHTS OF HARMING ANYONE ELSE?: NO

## 2024-05-15 SDOH — ECONOMIC STABILITY: INCOME INSECURITY: IN THE LAST 12 MONTHS, WAS THERE A TIME WHEN YOU WERE NOT ABLE TO PAY THE MORTGAGE OR RENT ON TIME?: NO

## 2024-05-15 SDOH — ECONOMIC STABILITY: HOUSING INSECURITY: IN THE LAST 12 MONTHS, HOW MANY PLACES HAVE YOU LIVED?: 1

## 2024-05-15 SDOH — SOCIAL STABILITY: SOCIAL INSECURITY
ASK PARENT OR GUARDIAN: ARE THERE TIMES WHEN YOU, YOUR CHILD(REN), OR ANY MEMBER OF YOUR HOUSEHOLD FEEL UNSAFE, HARMED, OR THREATENED AROUND PERSONS WITH WHOM YOU KNOW OR LIVE?: NO

## 2024-05-15 SDOH — ECONOMIC STABILITY: TRANSPORTATION INSECURITY
IN THE PAST 12 MONTHS, HAS LACK OF TRANSPORTATION KEPT YOU FROM MEETINGS, WORK, OR FROM GETTING THINGS NEEDED FOR DAILY LIVING?: NO

## 2024-05-15 SDOH — SOCIAL STABILITY: SOCIAL INSECURITY: ABUSE: PEDIATRIC

## 2024-05-15 ASSESSMENT — PAIN - FUNCTIONAL ASSESSMENT
PAIN_FUNCTIONAL_ASSESSMENT: 0-10

## 2024-05-15 ASSESSMENT — ACTIVITIES OF DAILY LIVING (ADL)
DRESSING YOURSELF: INDEPENDENT
HEARING - RIGHT EAR: FUNCTIONAL
ADEQUATE_TO_COMPLETE_ADL: YES
HEARING - LEFT EAR: FUNCTIONAL
BATHING: INDEPENDENT
TOILETING: INDEPENDENT
FEEDING YOURSELF: INDEPENDENT
WALKS IN HOME: INDEPENDENT
GROOMING: INDEPENDENT
JUDGMENT_ADEQUATE_SAFELY_COMPLETE_DAILY_ACTIVITIES: YES
PATIENT'S MEMORY ADEQUATE TO SAFELY COMPLETE DAILY ACTIVITIES?: YES

## 2024-05-15 ASSESSMENT — PAIN SCALES - GENERAL
PAINLEVEL_OUTOF10: 0 - NO PAIN
PAINLEVEL_OUTOF10: 1
PAINLEVEL_OUTOF10: 0 - NO PAIN

## 2024-05-15 NOTE — SIGNIFICANT EVENT
"Daniele is now s/p palatal fistula repair and left buccal fat flap today with Dr. Su. Patient was extubated post-operatively without complication, and when stable transferred from PACU to the regular [place] [address] nursing floor.      23:00- Notified by RN, patient had \"something\" come out of his mouth and looks like a piece of tissue.    00:30- Assessed at bedside, examined upper palate which is intact, sutures intact, Dr. Su notified and piece is tissel and surgical site is intact, Continue to monitor.     PIOTR Son-CNP  Plastic and Reconstructive Surgery   Available via Haiku  Pager #[phone]  Team phone: [phone]       "

## 2024-05-15 NOTE — CARE PLAN
The clinical goals for the shift include pts pain will remain at or belwo 4/10 with interventions through 1900 5/15    Pt AVSS, pain managed with scheduled tylenol. Oral incision clean with no evidence of drainage. Tolerating PO intake, UOP adequate. waiting to be discharged home with mom and dad, no questions at this time

## 2024-05-15 NOTE — PROGRESS NOTES
"  Department of Plastic and Reconstructive Surgery  Daily Progress Note    Patient Name: Daniele Medina  MRN: [id number]  Date:  05/15/24     Subjective  Resting in bed comfortably post operatively. Denies any acute concerns, pain well controlled. Denies any fever, chills, night sweats, CP, SOB, palpitations, nausea, vomiting, or abdominal pain/discomfort.     Overnight Events  POD0 S/P palatal fistula repair, left buccal fat flap    Objective    Vital Signs  /88 (BP Location: Right arm, Patient Position: Lying)   Pulse 104   Temp 37 °C (98.6 °F) (Temporal)   Resp 18   Ht 1.78 m (5' 10.08\")   Wt 67.1 kg (148 lb 0.6 oz)   SpO2 97%   BMI 21.19 kg/m²      Physical Exam   Constitutional: Alert, awake, calm and cooperative. NAD. Parents at bedside  Eyes: EOMI, PERRL  Mouth: Intraoral incisions intact with no evidence of drainage, bleeding or dehiscence. No cleft lip.  Head/Neck: Residual concave facial profile  Respiratory: Unlabored respirations on RA  Cardiovascular: RRR on telemetry monitor  Abdomen: Soft, nt/nd  : Voiding independently  MSK: MAEx4, no swelling of joints  Neuro: A&Ox3, no deficits appreciated  Skin: Warm, dry, intact  Psych: Appropriate mood/behavior    Diagnostics   No results found for this or any previous visit (from the past 24 hour(s)).  No results found.    Current Medications  Scheduled medications  acetaminophen, 650 mg, oral, q6h  busPIRone, 5 mg, oral, BID  chlorhexidine, 15 mL, Swish & Spit, TID  dexAMETHasone, 4 mg, intravenous, q8h  ibuprofen, 400 mg, oral, q6h KAM      Continuous medications  dextrose 5%-0.45 % sodium chloride, 100 mL/hr, Last Rate: Stopped (05/15/24 0400)      PRN medications  PRN medications: oxyCODONE     Assessment  -5/16/23- Lefort 1, BSSO, genioplasty   -6/29/20- surgically assisted rapid palatal expansion   -CP repair, and eye surgery x 2    Daniele is now s/p palatal fistula repair and left buccal fat flap today with Dr. Su. Patient was extubated " "post-operatively without complication, and when stable transferred from PACU to the regular [place] [address] nursing floor.  Oxygen saturation has remained stable since extubation in OR.     Plastics Team was notified by RN at 2300 5/14/24 stating he had \"something\" come out of his mouth and looks like a piece of tissue.     Plastic on call team visited Daniele at 00:30- Assessed at bedside, examined upper palate which is intact, sutures intact, Dr. Su notified and piece is tissel and surgical site is intact, Continue to monitor.     Plan:  - Plan for discharge this morning, patient tolerating diet without N/V and pain is well controlled.   - Continuous pulse ox overnight. Keep HOB elevated.   - Full liquid diet        ·  No straws. Use care when using utensils to not disrupt intraoral closure.  - IVF saline lock when tolerating liquid diet  - Decadron q8 x 3 doses. 8mg (At 2030) then 4 mg x 2. (0430, 1230)  - No zofran, given cardiac history (aortic stenosis, unicuspid aortic valve)  - Only suction intraorally with soft French catheter suction to help with oral secretions as needed, but avoid the palate area. Do not suction with Yankauer.  - SCDs until ambulating. Early ambulation.   - Post operative pain management:   - Acetaminophen PO Q6 Scheduled   - Ibuprofen PO Q6 Scheduled   - Oxycodone PO Q6 PRN for moderate pain- Use for first line breakthrough pain  - Anxiety- continue home Buspar PO 5mg BID   - Home going medication to be sent to preferred outpatient pharmacy   - Follow up with Dr. Su in 2 weeks (appt time to be given at discharge)    Patient seen and plan discussed with Dr. Georges Lopez, [facility]  Pediatric Plastic and Reconstructive Surgery   Haiku  [phone]  On Call Plastic Surgery team [phone] or Pager #[phone]      "

## 2024-05-15 NOTE — DISCHARGE SUMMARY
Discharge Diagnosis  Palatal fistula    Issues Requiring Follow-Up  - If you notice separation or opening along the repaired incision  - Any injury to the incision or heavy bleeding from the mouth  - Pus like drainage or foul odor from surgical site  - Fever greater than 101°F   - Pain that is not controlled by the prescribed pain medication  - unable to tolerate drinking fluids  - Persistent nausea and vomiting       Test Results Pending At Discharge  Pending Labs       No current pending labs.            Hospital Course   Daniele underwent palatal fistula repair and left buccal fat flap 5/14/24 with Dr. Su.  Please refer to operative note for further details. Patient was extubated post-operatively without complication, and when stable tranferred PACU to the regular [address] floor. Pain was controlled with PO pain medication. Patient was transitioned to a full liquid diet. On the day of discharge, the patient was ambulating without difficulty, voiding spontaneously, passing flatus, was tolerating diet without nausea or vomiting. Prior to discharge, appropriate follow-up was arranged: 5/20/24 @ 240pm with Dr. Su.    Pertinent Physical Exam At Time of Discharge  Physical Exam  Constitutional: Alert, awake, calm and cooperative. NAD. Parents at bedside  Eyes: EOMI, PERRL  Mouth: Intraoral incisions intact with no evidence of drainage, bleeding or dehiscence.   Head/Neck: Residual concave facial profile  Respiratory: Unlabored respirations on RA  Cardiovascular: RRR on telemetry monitor  Abdomen: Soft, nt/nd  : Voiding independently  MSK: MAEx4, no swelling of joints  Neuro: A&Ox3, no deficits appreciated  Skin: Warm, dry, intact  Psych: Appropriate mood/behavior  Home Medications     Medication List      ASK your doctor about these medications     brimonidine 0.2 % ophthalmic solution; Commonly known as: AlphaGAN   busPIRone 5 mg tablet; Commonly known as: Buspar   dorzolamide-timoloL 22.3-6.8 mg/mL  ophthalmic solution; Commonly known   as: Cosopt   hypromellose 0.3 % opthalmic gel; Commonly known as: GENTEAL GEL   latanoprost 0.005 % ophthalmic solution; Commonly known as: Xalatan       Outpatient Follow-Up  5/20/24 @ 240pm with Dr. Su at [facility] [address]    PIOTR Gray-CNP

## 2024-05-15 NOTE — DISCHARGE INSTRUCTIONS
Division of Pediatric Plastic and Craniofacial Surgery  [address] [place] [address]  P: [phone]  F: [phone]    Palatal fistula repair, left buccal fat flap: Instructions after Surgery    Your child had a palatal fistula repair and left buccal fat flap. Please follow special instructions below to care for your child after discharge.    Protecting the Repair:  The palatal fistula repair should be protected for about 4 weeks after surgery to allow for proper healing.   Please keep hard objects out of mouth while surgical site is healing.      Drinking and Eating:  It's important to make sure that you are drinking enough liquids to stay hydrated.  You should stay on a full liquid diet until follow up visit with Dr. Su.  During your first post-surgery clinic appointment, possible advancement to a soft diet for 4 weeks   Don't eat foods that are hard or crunchy such as cereal, cookies, crackers or chips.    Wound and Mouth Care:  Sutures will dissolve in 4 to 6 weeks -   Okay to brush lower teeth, be very cautious/gentle brushing front upper teeth, avoid brushing palate.  Use prescribed mouth wash 2-3 times daily and after every meal  Rinse mouth with water after all intake      Activity:  Avoid lifting weight greater than 10-15lbs  Avoid bending over at the waist, bend at the knees  Avoid using straws   Use care when using utensils to not disrupt intraoral closure     Pain Control:  Take acetaminophen and/or ibuprofen every 6 hours as needed for pain  Consider alternating and staggering the acetaminophen and ibuprofen if using both.   For Example:  At 8 a.m., give 1 dose of acetaminophen  Then, 3 hours later at 11 a.m., give 1 dose of ibuprofen  At 2 p.m., give 1 dose of acetaminophen again.  At 5 p.m., give 1 dose of ibuprofen.    Continue cycle as needed for pain relief.    For severe pain that is not alleviated by the acetaminophen or ibuprofen, you can take  oxycodone  every 6 hours as needed for breakthrough pain control.  Oxycodone can be associated with constipation.     When to Call Our Team:  - If you notice separation or opening along the repaired incision  - Any injury to the incision or heavy bleeding from the mouth  - Pus like drainage or foul odor from surgical site  - Fever greater than 101°F   - Pain that is not controlled by the prescribed pain medication  - unable to tolerate drinking fluids  - Persistent nausea and vomiting    If you have any questions or concerns, please contact the pediatric plastic surgery team:  [address]  Plastic Surgery Nurse- [phone]; [email or web address]  Plastic Surgery Nurse [email or web address]-[phone];  [email or web address]   Weekends and After hours: [facility] line [phone], Ask for Plastic Surgery on call     Follow up Visits:  Follow up with Dr. Su on 5/20/24 @ 240pm at [facility]

## 2024-05-20 ENCOUNTER — OFFICE VISIT (OUTPATIENT)
Dept: PLASTIC SURGERY | Facility: CLINIC | Age: 19
End: 2024-05-20
Payer: COMMERCIAL

## 2024-05-20 VITALS
HEART RATE: 146 BPM | BODY MASS INDEX: 21.05 KG/M2 | DIASTOLIC BLOOD PRESSURE: 94 MMHG | SYSTOLIC BLOOD PRESSURE: 144 MMHG | HEIGHT: 70 IN | WEIGHT: 147 LBS

## 2024-05-20 DIAGNOSIS — M27.8 PALATAL FISTULA: Primary | ICD-10-CM

## 2024-05-20 PROCEDURE — 99024 POSTOP FOLLOW-UP VISIT: CPT | Performed by: SURGERY

## 2024-05-20 PROCEDURE — 1036F TOBACCO NON-USER: CPT | Performed by: SURGERY

## 2024-05-20 NOTE — PROGRESS NOTES
Clinic Note    Reason For Visit  Palatal fistula    History Of Present Illness  Daniele Medina is a 18 y.o. male with a history of syndrome and cleft palate who most recently underwent orthognathic surgery including LeFort I, BSSO and genioplasty with my partner Dr. Marte in 2023.  His other surgical history includes history of cleft palate repair, multiple eye surgeries and a surgically assisted assisted rapid palatal expansion performed in June 2020 by Dr. Cheng. He was recently seen for a symptomatic palatal fistula and underwent palatal fistula repair with a palate rerepair technique along with left-sided buccal fat flap reinforcement on 5/14/2024 and now presents for his first postoperative appointment.    Mom and dad reports that he has been doing well and any required Tylenol for few days.  He has been tolerating a liquid diet and have not noticed any concerns about the surgical site.     Past Medical History  He has a past medical history of Adverse effect of anesthesia, Aortic stenosis, and Heart valve disease.    Surgical History  He has a past surgical history that includes Myringotomy w/ tubes (10/13/2014); Other surgical history (10/13/2014); Other surgical history (10/13/2014); and Gastrostomy tube placement (10/13/2014).     Social History  He reports that he has never smoked. He has never been exposed to tobacco smoke. He has never used smokeless tobacco. He reports that he does not drink alcohol. No history on file for drug use.    Allergies  Patient has no known allergies.    Review of Systems  Negative other than what is included in the HPI.      Physical Exam  On exam, Daniele Medina is well-appearing and well-developed.  he is breathing comfortably on room air and is in no distress.  Focused examination of His affected region reveals:     Palate: Intraoral exam reveals that the palatal root repair is intact with no evidence of dehiscence at this point.  The left buccal fat flap pedicle is  intact as well.  Mild residual edema present in the left cheek.    Residual concave facial profile improved compared to before  Intraoral exam reveals slight positive overjet and overbite  Fixed orthodontic appliances are still in place    ISR: none  ISS: 3/4 crown  Overjet: 2mm  Overbite: 1mm  MxMid: conicident with facial midline  Mandmid: missing 1 incisor      Nasal exam reveals excess flaring of both ala, more on the left compared to the right  Poor nasal tip support  Mild septal deviation       No hypernasality    Relevant Results      Assessment/Plan     Daniele Medina is a 18 y.o. male who most recently underwent repair of a symptomatic palatal fistula on 5/14/2024.  Overall he is doing well thus far and I recommended that he advance to a soft diet at this point.  We also discussed that he should continue using Peridex mouthwash for a total of 2 weeks after surgery and then transition to over-the-counter mouthwash.  I would like to see them back in approximately 3 to 4 weeks for another follow-up and the family would prefer at the [place] location.  I look forward to seeing the family then for another follow-up appointment.      Obed Su MD

## 2024-06-10 ENCOUNTER — OFFICE VISIT (OUTPATIENT)
Dept: PLASTIC SURGERY | Facility: CLINIC | Age: 19
End: 2024-06-10
Payer: COMMERCIAL

## 2024-06-10 VITALS
WEIGHT: 147 LBS | SYSTOLIC BLOOD PRESSURE: 143 MMHG | HEART RATE: 108 BPM | HEIGHT: 70 IN | DIASTOLIC BLOOD PRESSURE: 99 MMHG | BODY MASS INDEX: 21.05 KG/M2

## 2024-06-10 DIAGNOSIS — M27.8 PALATAL FISTULA: Primary | ICD-10-CM

## 2024-06-10 PROCEDURE — 99024 POSTOP FOLLOW-UP VISIT: CPT | Performed by: SURGERY

## 2024-06-10 PROCEDURE — 1036F TOBACCO NON-USER: CPT | Performed by: SURGERY

## 2024-06-10 NOTE — PROGRESS NOTES
Clinic Note    Reason For Visit  Palatal fistula    History Of Present Illness  Daniele Medina is a 18 y.o. male with a history of syndrome and cleft palate who most recently underwent orthognathic surgery including LeFort I, BSSO and genioplasty with my partner Dr. Marte in 2023.  His other surgical history includes history of cleft palate repair, multiple eye surgeries and a surgically assisted assisted rapid palatal expansion performed in June 2020 by Dr. Cheng. He was recently seen for a symptomatic palatal fistula and underwent palatal fistula repair with a palate rerepair technique along with left-sided buccal fat flap reinforcement on 5/14/2024 and now presents for another postoperative appointment.    Mom reports that he has been doing well.  He has been tolerating the soft diet and denies any food or liquid regurgitation.  They have not noticed any recurrence of the fistula.      Past Medical History  He has a past medical history of Adverse effect of anesthesia, Aortic stenosis, and Heart valve disease.    Surgical History  He has a past surgical history that includes Myringotomy w/ tubes (10/13/2014); Other surgical history (10/13/2014); Other surgical history (10/13/2014); Gastrostomy tube placement (10/13/2014); and Palate fistula closure (05/14/2024).     Social History  He reports that he has never smoked. He has never been exposed to tobacco smoke. He has never used smokeless tobacco. He reports that he does not drink alcohol. No history on file for drug use.    Allergies  Patient has no known allergies.    Review of Systems  Negative other than what is included in the HPI.      Physical Exam  On exam, Daniele Medina is well-appearing and well-developed.  he is breathing comfortably on room air and is in no distress.  Focused examination of His affected region reveals:     Palate: Intraoral exam reveals that the palatal repair is intact with no evidence of dehiscence.  The left buccal fat flap  pedicle is now mucosalized and healed with excellent contour.      Residual concave facial profile improved compared to before  Intraoral exam reveals slight positive overjet and overbite  Fixed orthodontic appliances are still in place    ISR: none  ISS: 3/4 crown  Overjet: 2mm  Overbite: 1mm  MxMid: conicident with facial midline  Mandmid: missing 1 incisor      Nasal exam reveals excess flaring of both ala, more on the left compared to the right  Poor nasal tip support  Mild septal deviation       No hypernasality    Relevant Results      Assessment/Plan     Daniele Medina is a 18 y.o. male who most recently underwent repair of a symptomatic palatal fistula on 5/14/2024.  Overall he is doing well thus far and I recommended that he can advance to a regular diet.  Also discussed that he can continue to use the regular mouthwash and maintain visual and oral hygiene.  He can also resume wearing his orthodontic retainer.  Otherwise, I look forward to seeing him in 4 weeks for another follow-up appointment.        Obed Su MD

## 2024-07-08 ENCOUNTER — APPOINTMENT (OUTPATIENT)
Dept: PLASTIC SURGERY | Facility: CLINIC | Age: 19
End: 2024-07-08
Payer: COMMERCIAL

## 2024-07-08 VITALS
DIASTOLIC BLOOD PRESSURE: 93 MMHG | HEART RATE: 105 BPM | BODY MASS INDEX: 21.05 KG/M2 | HEIGHT: 70 IN | WEIGHT: 147 LBS | SYSTOLIC BLOOD PRESSURE: 149 MMHG

## 2024-07-08 DIAGNOSIS — Q35.9 CLEFT PALATE (HHS-HCC): ICD-10-CM

## 2024-07-08 DIAGNOSIS — M27.8 PALATAL FISTULA: Primary | ICD-10-CM

## 2024-07-08 PROCEDURE — 1036F TOBACCO NON-USER: CPT | Performed by: SURGERY

## 2024-07-08 PROCEDURE — 99024 POSTOP FOLLOW-UP VISIT: CPT | Performed by: SURGERY

## 2024-07-08 NOTE — PROGRESS NOTES
Clinic Note    Reason For Visit  Palatal fistula    History Of Present Illness  Daniele Medina is a 18 y.o. male with a history of syndrome and cleft palate who most recently underwent orthognathic surgery including LeFort I, BSSO and genioplasty with my partner Dr. Marte in 2023.  His other surgical history includes history of cleft palate repair, multiple eye surgeries and a surgically assisted assisted rapid palatal expansion performed in June 2020 by Dr. Cheng. He was recently seen for a symptomatic palatal fistula and underwent palatal fistula repair with a palate rerepair technique along with left-sided buccal fat flap reinforcement on 5/14/2024 and now presents for another postoperative appointment.    On encounter reports that he has been doing well.  They have not noticed any fluid regurgitation of food getting stuck near the fistula site.  He has not had any issues related to the buccal fat flap pedicle.      Past Medical History  He has a past medical history of Adverse effect of anesthesia, Aortic stenosis, and Heart valve disease.    Surgical History  He has a past surgical history that includes Myringotomy w/ tubes (10/13/2014); Other surgical history (10/13/2014); Other surgical history (10/13/2014); Gastrostomy tube placement (10/13/2014); and Palate fistula closure (05/14/2024).     Social History  He reports that he has never smoked. He has never been exposed to tobacco smoke. He has never used smokeless tobacco. He reports that he does not drink alcohol. No history on file for drug use.    Allergies  Patient has no known allergies.    Review of Systems  Negative other than what is included in the HPI.      Physical Exam  On exam, Daniele Medina is well-appearing and well-developed.  he is breathing comfortably on room air and is in no distress.  Focused examination of His affected region reveals:     Palate: Intraoral exam reveals that the palatal repair is well-healed with no evidence of  dehiscence.    Residual concave facial profile improved compared to before  Intraoral exam reveals slight positive overjet and overbite  Fixed orthodontic appliances are still in place    ISR: none  ISS: 3/4 crown  Overjet: 2mm  Overbite: 1mm  MxMid: conicident with facial midline  Mandmid: missing 1 incisor      Nasal exam reveals excess flaring of both ala, more on the left compared to the right  Poor nasal tip support  Mild septal deviation       No hypernasality    Relevant Results      Assessment/Plan     Daniele Medina is a 18 y.o. male who most recently underwent repair of a symptomatic palatal fistula on 5/14/2024.  Overall he is doing very well and the fistula has healed completely.  Today we discussed that he now advance to a regular diet and use regular toothbrush.  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  At this point, I advised the parents to reach out to my office when they are ready to schedule surgery or they have any additional questions or concerns regarding his palatal fistula or other issues.      Obed Su MD

## (undated) DEVICE — RESERVOIR, DRAINAGE, WOUND, JACKSON-PRATT, 100 CC, SILICONE

## (undated) DEVICE — COUNTER, NEEDLE, FOAM BLOCK, W/MAGNET, W/BLADE GUARD, 10 COUNT, RED, LF

## (undated) DEVICE — NEEDLE, HYPODERMIC, MONOJECT, TRI-BEVELED, ANTI-CORING, 25 G X 1.25 IN, LUER LOCK HUB, RED

## (undated) DEVICE — DEPRESSOR, TONGUE, 6 IN, WOOD, STERILE, LF

## (undated) DEVICE — GLOVE, SURGICAL, PROTEXIS,  6.0, PF, LATEX

## (undated) DEVICE — SYRINGE, 60 CC, IRRIGATION, BULB, CONTRO-BULB, PAPER POUCH

## (undated) DEVICE — Device

## (undated) DEVICE — ELECTRODE, ELECTROSURGICAL, NEEDLE, STERILE, LF

## (undated) DEVICE — GOWN, ASTOUND, L

## (undated) DEVICE — MARKER, SKIN, DUAL TIP, W/RULER

## (undated) DEVICE — SUTURE, PROLENE, 6-0, 24 IN, BV-1, BLUE

## (undated) DEVICE — SUTURE, MONOCRYL, 5-0, 18 IN, PS2, UNDYED

## (undated) DEVICE — SPONGE, GAUZE, XRAY DECT, 16 PLY, 4 X 4, W/MASTER DMT,STERILE

## (undated) DEVICE — NEEDLE, HYPODERMIC, MONOJECT, TRI-BEVELED, ANTI-CORING, 27 G X 1.25 IN, LUER LOCK HUB, YELLOW

## (undated) DEVICE — SUTURE, PLAIN, 5-0, 18 IN, PC1, YELLOW

## (undated) DEVICE — NEEDLE, HYPODERMIC, REGULAR WALL, REGULAR BEVEL, 30 G X 0.5 IN

## (undated) DEVICE — STRIP, SKIN CLOSURE, STERI STRIP, REINFORCED, 0.5 X 4 IN

## (undated) DEVICE — ROLL, DENTAL, 3/8 X 1-1/2, STERILE, 5/PK

## (undated) DEVICE — SUTURE, ETHILON, 3-0, 18 IN, PS2, BLACK

## (undated) DEVICE — TUBING, SUCTION, CONNECTING, STERILE 0.25 X 120 IN., LF

## (undated) DEVICE — TIP, SUCTION, EXTENDED LUMEN, 12 FR, 5 CM, VITAL VUE, METAL

## (undated) DEVICE — KNIFE, OPHTHALMIC, SLIT, 22.5 DEG

## (undated) DEVICE — SEALANT, HEMOSTATIC, FLOSEAL, 10 ML

## (undated) DEVICE — SYRINGE, HYPODERMIC, CONTROL, LUER LOCK, 10 CC, PLASTIC, STERILE

## (undated) DEVICE — CLEANER, ELECTROSURGICAL, TIP, 5 X 5 CM, LF

## (undated) DEVICE — APPLICATOR, COTTON TIP, 6 IN, LF, STERILE

## (undated) DEVICE — DRESSING, TRANSPARENT, TEGADERM, 2-3/8 X 2-3/4 IN

## (undated) DEVICE — KNIFE, OPHTHALMIC, CRESCENT, ANGLED, BEVEL UP, SATIN

## (undated) DEVICE — SUTURE, CHROMIC 5-0 RB1, 27IN

## (undated) DEVICE — DRAPE, INSTRUMENT, W/POUCH, STERI DRAPE, 7 X 11 IN, DISPOSABLE, STERILE

## (undated) DEVICE — COVER, LIGHT HANDLE, SURGICAL, FLEXIBLE, DISPOSABLE, STERILE

## (undated) DEVICE — COVER, CART, 45 X 27 X 48 IN, CLEAR

## (undated) DEVICE — SYRINGE, LUER LOCK, 12ML

## (undated) DEVICE — SLEEVE, SURGICAL, 21.5 X 5.5 IN, LF, STERILE

## (undated) DEVICE — NEEDLE, ELECTRODE, ELECTROSURGICAL, INSULATED

## (undated) DEVICE — TISSEEL FIBRIN SEALANT, PRIMA, FROZEN, 4ML

## (undated) DEVICE — CAUTERY, PENCIL, PUSH BUTTON, SMOKE EVAC, 70MM